# Patient Record
Sex: FEMALE | Race: WHITE | Employment: FULL TIME | URBAN - METROPOLITAN AREA
[De-identification: names, ages, dates, MRNs, and addresses within clinical notes are randomized per-mention and may not be internally consistent; named-entity substitution may affect disease eponyms.]

---

## 2017-05-02 RX ORDER — METOPROLOL SUCCINATE 25 MG/1
TABLET, EXTENDED RELEASE ORAL
Qty: 90 TAB | Refills: 3 | Status: SHIPPED | OUTPATIENT
Start: 2017-05-02 | End: 2018-04-09 | Stop reason: SDUPTHER

## 2017-10-16 ENCOUNTER — TELEPHONE (OUTPATIENT)
Dept: CARDIOLOGY CLINIC | Age: 57
End: 2017-10-16

## 2017-10-16 NOTE — TELEPHONE ENCOUNTER
Pt calling to states that her heart is racing between 128-130 bpm. She took her toprol this morning at 730  amand just took another one when the episodes started around 115 pm. Pt just feels \"warn out\". Pt states the only new medication she started was pravachol and was wondering if that could have increased her heart rate. Her heart rate now is 116. One hour after the extra toprol. Verbal order and read back per Luis Eduardo Romero MD   [id number] Lorrie Tovar have pt do the valsalvae maneuver (bear down) to see if that helps too.    To call back if she is not feeling better or the heart rate persist     Pt verbalized understanding of instructions

## 2017-10-19 NOTE — TELEPHONE ENCOUNTER
I called and discussed the patient's situation with her. She tells me that she had a heart rate of 130 140 which she was able to check with her friends Fitbit for about 3-1/2-4 hours which slowly resolved after taking a second 25 mg of Toprol. I told her I thought this was probably a one time thing since she relates that she really has not had palpitations in the past year or 2. She will continue to take her Toprol-XL 25 mg a day and if this occurs again she will take an extra Toprol but she will give us a call and we will send her an event monitor for a month to see if we can document exactly what we are dealing with.  ES

## 2018-04-11 RX ORDER — METOPROLOL SUCCINATE 25 MG/1
TABLET, EXTENDED RELEASE ORAL
Qty: 90 TAB | Refills: 3 | Status: SHIPPED | OUTPATIENT
Start: 2018-04-11 | End: 2018-11-07 | Stop reason: SDUPTHER

## 2018-11-07 ENCOUNTER — OFFICE VISIT (OUTPATIENT)
Dept: CARDIOLOGY CLINIC | Age: 58
End: 2018-11-07

## 2018-11-07 VITALS
DIASTOLIC BLOOD PRESSURE: 76 MMHG | BODY MASS INDEX: 21.9 KG/M2 | WEIGHT: 116 LBS | OXYGEN SATURATION: 99 % | SYSTOLIC BLOOD PRESSURE: 112 MMHG | HEIGHT: 61 IN | HEART RATE: 63 BPM

## 2018-11-07 DIAGNOSIS — I47.1 PSVT (PAROXYSMAL SUPRAVENTRICULAR TACHYCARDIA) (HCC): Primary | ICD-10-CM

## 2018-11-07 DIAGNOSIS — R00.2 PALPITATIONS: ICD-10-CM

## 2018-11-07 NOTE — PROGRESS NOTES
HPI: I saw Collette Splinter. Rountree in my office today in cardiovascular evaluation regarding her problems with palpitations and mild mitral valve prolapse prior to a minor breast reconstruction surgery being scheduled to be completed next week. Ms. Charly Toscano is a very pleasant 61year old white female with history of mild mitral valve prolapse documented on echocardiogram a number of years ago reportedly when she had some palpitations. The palpitations have been documented to be an AV trinidad reentrant tachycardia, for which she saw my associate Dr. Cassandra Greenwood back in September of 2014, and this was thought to be quite easily curable with an ablation, however she was fairly asymptomatic and simply stayed on Toprol XL 25 mg a day and has done very well with very rare palpitations. She comes in today for the first time in over 2 years and tells me that she is doing reasonably well. She still has palpitations from time to time but these are occurring only about once every 3 or 4 months. She does relate that they can last for for 5 hours, but she does not think that they are very frequent and are tolerable on her low-dose beta-blocker therapy. Encounter Diagnoses Name Primary?  Palpitations  PSVT (paroxysmal supraventricular tachycardia) ([facility] [address] Yes Discussion: This lady appears to be doing about as well as we could expect and I really have no recommendations for change at this time. She does have an AV trinidad reentrant tachycardia which we have discussed intervening on if it becomes more frequent, but she seems to do very well for the most part on just low-dose Toprol and I am simply get a leave her on that medication. We did discuss the possibility of unilateral carotid artery massage or vagal maneuvers to help with breaking an episode of SVT and she also asked if I thought was reasonable for her to take an extra Toprol if she has an episode and I think that is reasonable. Since she is otherwise doing well I will simply plan to see her again in a year. PCP: Marletta Goodpasture, DO Past Medical History:  
Diagnosis Date  Anxiety  Arthritis   
 hands  Breast cancer ([facility] [place] [address]   
 left side  Bruises easily  History of echocardiogram 2010 [facility]:  EF 70%. No WMA. RVSP 20 mmHg.  History of myocardial perfusion scan 2010 [facility]:  No ischemia or prior infarction. EF 70%. Neg EKG on max EST. Ex time 5 min 30 sec.  MVP (mitral valve prolapse)  Panic attacks  Peripheral neuropathy   
 mild  PSVT (paroxysmal supraventricular tachycardia) ([facility] [place] [address]  Vitamin D deficiency Past Surgical History:  
Procedure Laterality Date  HX COLONOSCOPY  2010  HX HYSTERECTOMY  2008  HX MASTECTOMY Left 10/07 Current Outpatient Medications Medication Sig  
 metoprolol succinate (TOPROL-XL) 25 mg XL tablet TAKE 1 TABLET BY MOUTH EVERY DAY  cholecalciferol, vitamin D3, (VITAMIN D3) 2,000 unit tab Take 1 Tab by mouth daily.  ALPRAZolam (XANAX) 0.5 mg tablet Take 0.5 mg by mouth nightly as needed for Anxiety. No current facility-administered medications for this visit. Allergies Allergen Reactions  Sulfa (Sulfonamide Antibiotics) Unknown (comments) Social History : 
Social History Tobacco Use  Smoking status: Former Smoker Packs/day: 0.25 Years: 14.00 Pack years: 3.50 Types: Cigarettes Last attempt to quit: 1994 Years since quittin.8  Smokeless tobacco: Never Used Substance Use Topics  Alcohol use: Yes Comment: socially Family History: family history includes Cancer in her mother; Diabetes in her mother; GERD in her mother; Heart defect in her mother; High Cholesterol in her mother; Hypertension in her mother; Stroke in her father. Review of Systems:  
Constitutional: Negative. Respiratory: Negative. Cardiovascular: Positive for palpitations. Gastrointestinal: Negative. Musculoskeletal: Negative. Physical Exam:  The patient is a cooperative, alert, well developed, well nourished 61 y.o.  female who is in no acute distress at the time of the examination. Visit Vitals /76 Pulse 63 Ht 5' 1\" (1.549 m) Wt 52.6 kg (116 lb) SpO2 99% BMI 21.92 kg/m² HEENT: Conjuctiva white, mucosa moist, no pallor or cyanosis. NECK: Supple without masses, tenderness or thyromegaly. There was no jugular venous distention. Carotid are full bilaterally without bruits. CHEST: Symmetrical with good excursion. LUNGS: Clear to auscultation in all fields. HEART: The apex is not displaced. There were no lifts, thrills or heaves. There is a normal S1 and S2. There is a  grade 2-6/4 apical systolic murmur with poor radiation without appreciable diastolic murmurs, rubs, or gallops auscultated. ABDOMEN: Soft without masses, tenderness or organomegaly. EXTREMITIES: Full peripheral pulses without peripheral edema. INTEGUMENT: Warm and dry NEUROLOGICAL: The patient is oriented x 3 with motor function grossly intact. Review of Data: See PMH and Cardiology and Imaging sections for cardiac testing Results for orders placed or performed in visit on 11/07/18 AMB POC EKG ROUTINE W/ 12 LEADS, INTER & REP     Status: None Narrative Normal sinus rhythm rate 63. There is an RSR prime normal variant in leads V1 and V2. This tracing is within normal limits and similar to the EKG of August 23, 2016. Giovana Villar D.O., F.A.C.C. [facility] [address] [place] [address] [address] [phone]

## 2018-12-04 RX ORDER — METOPROLOL SUCCINATE 25 MG/1
TABLET, EXTENDED RELEASE ORAL
Qty: 90 TAB | Refills: 3 | Status: SHIPPED | OUTPATIENT
Start: 2018-12-04 | End: 2019-11-19 | Stop reason: SDUPTHER

## 2019-11-19 RX ORDER — METOPROLOL SUCCINATE 25 MG/1
TABLET, EXTENDED RELEASE ORAL
Qty: 90 TAB | Refills: 0 | Status: SHIPPED | OUTPATIENT
Start: 2019-11-19 | End: 2020-02-12

## 2020-02-12 RX ORDER — METOPROLOL SUCCINATE 25 MG/1
TABLET, EXTENDED RELEASE ORAL
Qty: 90 TAB | Refills: 3 | Status: SHIPPED | OUTPATIENT
Start: 2020-02-12 | End: 2021-01-26

## 2021-01-26 RX ORDER — METOPROLOL SUCCINATE 25 MG/1
TABLET, EXTENDED RELEASE ORAL
Qty: 90 TAB | Refills: 3 | Status: SHIPPED | OUTPATIENT
Start: 2021-01-26 | End: 2021-10-19

## 2021-10-19 RX ORDER — METOPROLOL SUCCINATE 25 MG/1
TABLET, EXTENDED RELEASE ORAL
Qty: 90 TABLET | Refills: 3 | Status: SHIPPED | OUTPATIENT
Start: 2021-10-19 | End: 2022-03-25

## 2022-03-25 RX ORDER — METOPROLOL SUCCINATE 25 MG/1
TABLET, EXTENDED RELEASE ORAL
Qty: 90 TABLET | Refills: 3 | Status: SHIPPED | OUTPATIENT
Start: 2022-03-25

## 2023-02-27 RX ORDER — METOPROLOL SUCCINATE 25 MG/1
TABLET, EXTENDED RELEASE ORAL
Qty: 90 TABLET | OUTPATIENT
Start: 2023-02-27

## 2023-02-28 RX ORDER — METOPROLOL SUCCINATE 25 MG/1
TABLET, EXTENDED RELEASE ORAL
Qty: 30 TABLET | Refills: 1 | OUTPATIENT
Start: 2023-02-28

## 2023-02-28 NOTE — TELEPHONE ENCOUNTER
Patient has not been seen since 2018 with Dr Iveth Rizvi but apparently we have still been filling her meds/ I told her she needed and appointment if she needed anything refilled.

## 2023-03-10 RX ORDER — METOPROLOL SUCCINATE 25 MG/1
TABLET, EXTENDED RELEASE ORAL
Qty: 90 TABLET | Refills: 3 | Status: SHIPPED | OUTPATIENT
Start: 2023-03-10

## 2023-05-05 ENCOUNTER — OFFICE VISIT (OUTPATIENT)
Age: 63
End: 2023-05-05
Payer: COMMERCIAL

## 2023-05-05 VITALS
BODY MASS INDEX: 23.19 KG/M2 | HEART RATE: 73 BPM | WEIGHT: 126 LBS | SYSTOLIC BLOOD PRESSURE: 128 MMHG | OXYGEN SATURATION: 99 % | DIASTOLIC BLOOD PRESSURE: 72 MMHG | HEIGHT: 62 IN

## 2023-05-05 DIAGNOSIS — I47.1 PSVT (PAROXYSMAL SUPRAVENTRICULAR TACHYCARDIA) (HCC): Primary | ICD-10-CM

## 2023-05-05 PROCEDURE — 99203 OFFICE O/P NEW LOW 30 MIN: CPT | Performed by: INTERNAL MEDICINE

## 2023-05-05 PROCEDURE — 93000 ELECTROCARDIOGRAM COMPLETE: CPT | Performed by: INTERNAL MEDICINE

## 2023-05-05 RX ORDER — METOPROLOL SUCCINATE 25 MG/1
25 TABLET, EXTENDED RELEASE ORAL DAILY
Qty: 90 TABLET | Refills: 3 | Status: SHIPPED | OUTPATIENT
Start: 2023-05-05

## 2023-05-05 ASSESSMENT — ENCOUNTER SYMPTOMS
ABDOMINAL DISTENTION: 0
SORE THROAT: 0
VOMITING: 0
NAUSEA: 0
COUGH: 0
SHORTNESS OF BREATH: 0
ABDOMINAL PAIN: 0

## 2023-05-05 ASSESSMENT — ANXIETY QUESTIONNAIRES
3. WORRYING TOO MUCH ABOUT DIFFERENT THINGS: 0
6. BECOMING EASILY ANNOYED OR IRRITABLE: 0
1. FEELING NERVOUS, ANXIOUS, OR ON EDGE: 0
5. BEING SO RESTLESS THAT IT IS HARD TO SIT STILL: 0
2. NOT BEING ABLE TO STOP OR CONTROL WORRYING: 0
7. FEELING AFRAID AS IF SOMETHING AWFUL MIGHT HAPPEN: 0
4. TROUBLE RELAXING: 0
GAD7 TOTAL SCORE: 0

## 2023-05-05 ASSESSMENT — PATIENT HEALTH QUESTIONNAIRE - PHQ9
SUM OF ALL RESPONSES TO PHQ QUESTIONS 1-9: 0
2. FEELING DOWN, DEPRESSED OR HOPELESS: 0
SUM OF ALL RESPONSES TO PHQ QUESTIONS 1-9: 0
1. LITTLE INTEREST OR PLEASURE IN DOING THINGS: 0
SUM OF ALL RESPONSES TO PHQ QUESTIONS 1-9: 0
SUM OF ALL RESPONSES TO PHQ QUESTIONS 1-9: 0
SUM OF ALL RESPONSES TO PHQ9 QUESTIONS 1 & 2: 0

## 2023-05-05 NOTE — PROGRESS NOTES
05/05/23     Cristina Amor  is a 60 y.o. female     Chief Complaint   Patient presents with    Follow-up     overdue   New patient she has not been seen in over 4 years    HPI    Patient presents as a new office visit. She has been lost to follow-up. She was last seen in our office approximately 4-1/2 years ago by Dr. Moses Yoo. She has a past medical history significant for paroxysmal supraventricular tachycardia. She states on average she may have 1 or 2 episodes a year. The episodes can last up to 3 to 4 hours in duration. She states she will take an extra metoprolol XL and this will typically improve her symptoms. She also is well versed at how to perform her vagal maneuvers. She denies any associated chest pain or syncope when she has had her tachycardia. She does describe her heart pounding and racing in her chest.  No major change in her activity tolerance. She tries to walk at least 4 times a week and plays pickle ball twice a week. She has never experienced any exertional chest pain or exertional palpitations. Past Medical History:   Diagnosis Date    Anxiety     Arthritis     hands    Breast cancer ([address]     left side    Bruises easily     History of echocardiogram 07/26/2010    [facility]:  EF 70%. No WMA. RVSP 20 mmHg. History of myocardial perfusion scan 07/29/2010    [facility]:  No ischemia or prior infarction. EF 70%. Neg EKG on max EST. Ex time 5 min 30 sec.     MVP (mitral valve prolapse)     Panic attacks     Peripheral neuropathy     mild    PSVT (paroxysmal supraventricular tachycardia) ([facility])     Vitamin D deficiency      Current Outpatient Medications   Medication Sig Dispense Refill    metoprolol succinate (TOPROL XL) 25 MG extended release tablet Take 1 tablet by mouth daily 90 tablet 3    Cholecalciferol 50 MCG (2000 UT) TABS Take 1 tablet by mouth once a week      estradiol (ESTRACE) 0.1 MG/GM vaginal cream Place 0.5 g vaginally as needed      methenamine

## 2023-05-05 NOTE — PROGRESS NOTES
Mg Arias presents today for   Chief Complaint   Patient presents with    Follow-up     overdue       Mg Arias preferred language for health care discussion is english/other. Is someone accompanying this pt? no    Is the patient using any DME equipment during OV? no    Depression Screening:  Depression: Not at risk    PHQ-2 Score: 0        Learning Assessment:  Who is the primary learner? Patient    What is the preferred language for health care of the primary learner? ENGLISH    How does the primary learner prefer to learn new concepts? DEMONSTRATION    Answered By patient    Relationship to Learner SELF           Pt currently taking Anticoagulant therapy? no    Pt currently taking Antiplatelet therapy ? no      Coordination of Care:  1. Have you been to the ER, urgent care clinic since your last visit? Hospitalized since your last visit? no    2. Have you seen or consulted any other health care providers outside of the [address] [place] [address] since your last visit? Include any pap smears or colon screening.  no

## 2024-05-06 ENCOUNTER — OFFICE VISIT (OUTPATIENT)
Age: 64
End: 2024-05-06
Payer: COMMERCIAL

## 2024-05-06 VITALS
OXYGEN SATURATION: 98 % | DIASTOLIC BLOOD PRESSURE: 70 MMHG | SYSTOLIC BLOOD PRESSURE: 110 MMHG | BODY MASS INDEX: 23.55 KG/M2 | HEART RATE: 69 BPM | WEIGHT: 128 LBS | HEIGHT: 62 IN

## 2024-05-06 DIAGNOSIS — I47.10 PSVT (PAROXYSMAL SUPRAVENTRICULAR TACHYCARDIA) (HCC): Primary | ICD-10-CM

## 2024-05-06 PROCEDURE — 93000 ELECTROCARDIOGRAM COMPLETE: CPT | Performed by: INTERNAL MEDICINE

## 2024-05-06 PROCEDURE — 99213 OFFICE O/P EST LOW 20 MIN: CPT | Performed by: INTERNAL MEDICINE

## 2024-05-06 RX ORDER — METOPROLOL SUCCINATE 25 MG/1
25 TABLET, EXTENDED RELEASE ORAL DAILY
Qty: 90 TABLET | Refills: 3 | Status: SHIPPED | OUTPATIENT
Start: 2024-05-06

## 2024-05-06 ASSESSMENT — ENCOUNTER SYMPTOMS
VOMITING: 0
COUGH: 0
ABDOMINAL DISTENTION: 0
NAUSEA: 0
SORE THROAT: 0
ABDOMINAL PAIN: 0
SHORTNESS OF BREATH: 0

## 2024-05-06 ASSESSMENT — PATIENT HEALTH QUESTIONNAIRE - PHQ9
SUM OF ALL RESPONSES TO PHQ9 QUESTIONS 1 & 2: 0
SUM OF ALL RESPONSES TO PHQ QUESTIONS 1-9: 0
2. FEELING DOWN, DEPRESSED OR HOPELESS: NOT AT ALL
SUM OF ALL RESPONSES TO PHQ QUESTIONS 1-9: 0
SUM OF ALL RESPONSES TO PHQ QUESTIONS 1-9: 0
1. LITTLE INTEREST OR PLEASURE IN DOING THINGS: NOT AT ALL
SUM OF ALL RESPONSES TO PHQ QUESTIONS 1-9: 0

## 2024-05-06 NOTE — PROGRESS NOTES
Mary Carmen Umaña presents today for   Chief Complaint   Patient presents with    Follow-up     1 year f/u with no concerns        Mary Carmen Umaña preferred language for health care discussion is english/other.    Is someone accompanying this pt? no    Is the patient using any DME equipment during OV? no    Depression Screening:  Depression: Not at risk (5/6/2024)    PHQ-2     PHQ-2 Score: 0        Learning Assessment:  Who is the primary learner? Patient    What is the preferred language for health care of the primary learner? ENGLISH    How does the primary learner prefer to learn new concepts? DEMONSTRATION    Answered By patient    Relationship to Learner SELF           Pt currently taking Anticoagulant therapy? no    Pt currently taking Antiplatelet therapy ? no      Coordination of Care:  1. Have you been to the ER, urgent care clinic since your last visit? Hospitalized since your last visit? no    2. Have you seen or consulted any other health care providers outside of the [facility] since your last visit? Include any pap smears or colon screening. no

## 2024-05-06 NOTE — PROGRESS NOTES
05/06/24     Mary Carmen Umaña  is a 63 y.o. female     Chief Complaint   Patient presents with    Follow-up     1 year f/u with no concerns      HPI    Patient presents for a follow-up office visit.  he has a past medical history significant for paroxysmal supraventricular tachycardia.  She states on average she may have 1 or 2 episodes a year.  The episodes can last up to 3 to 4 hours in duration.  She states she will take an extra metoprolol XL and this will typically improve her symptoms.  She also is well versed at how to perform her vagal maneuvers.    She was last seen in our office approximately 1 year ago.  Since last visit she has been feeling well.  She has had no recurrent heart palpitations, dizziness or syncopal episodes.  No major change in her activity tolerance.  She tries to exercise at least 4 days a week by doing a variety of physical activities including cardiovascular exercises, weight lifting and playing pickle ball.  She has not noted any new chest pain or shortness of breath with physical exertion.    Past Medical History:   Diagnosis Date    Anxiety     Arthritis     hands    Breast cancer ([facility])     left side    Bruises easily     History of echocardiogram 07/26/2010    [facility]  EF 70%.  No WMA.  RVSP 20 mmHg.      History of myocardial perfusion scan 07/29/2010    [facility]  No ischemia or prior infarction.  EF 70%.  Neg EKG on max EST.  Ex time 5 min 30 sec.    MVP (mitral valve prolapse)     Panic attacks     Peripheral neuropathy     mild    PSVT (paroxysmal supraventricular tachycardia) ([facility])     Vitamin D deficiency      Current Outpatient Medications   Medication Sig Dispense Refill    metoprolol succinate (TOPROL XL) 25 MG extended release tablet Take 1 tablet by mouth daily 90 tablet 3    Cranberry 360 MG CAPS Take 1 tablet by mouth daily      estradiol (ESTRACE) 0.1 MG/GM vaginal cream Place 0.5 g vaginally as needed       No current facility-administered medications for

## 2024-05-10 RX ORDER — METOPROLOL SUCCINATE 25 MG/1
25 TABLET, EXTENDED RELEASE ORAL DAILY
Qty: 90 TABLET | Refills: 3 | OUTPATIENT
Start: 2024-05-10